# Patient Record
Sex: FEMALE | Race: BLACK OR AFRICAN AMERICAN | NOT HISPANIC OR LATINO | Employment: FULL TIME | URBAN - METROPOLITAN AREA
[De-identification: names, ages, dates, MRNs, and addresses within clinical notes are randomized per-mention and may not be internally consistent; named-entity substitution may affect disease eponyms.]

---
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
## 2021-09-20 RX ORDER — NORGESTIMATE AND ETHINYL ESTRADIOL 7DAYSX3 28
KIT ORAL
Qty: 84 TABLET | Refills: 3 | OUTPATIENT
Start: 2021-09-20

## 2021-11-02 RX ORDER — NORGESTIMATE AND ETHINYL ESTRADIOL 7DAYSX3 28
KIT ORAL
Qty: 84 TABLET | Refills: 3 | OUTPATIENT
Start: 2021-11-02

## 2021-12-07 RX ORDER — NORGESTIMATE AND ETHINYL ESTRADIOL 7DAYSX3 28
KIT ORAL
Qty: 84 TABLET | Refills: 3 | OUTPATIENT
Start: 2021-12-07

## 2021-12-15 RX ORDER — NORGESTIMATE AND ETHINYL ESTRADIOL 7DAYSX3 28
KIT ORAL
Qty: 84 TABLET | Refills: 3 | OUTPATIENT
Start: 2021-12-15

## 2021-12-15 NOTE — TELEPHONE ENCOUNTER
Left message for Melissa to call the office to schedule an AE. Last seen 10/23/2019. Needs to be seen to get a refill of her pills. Asked her to call and schedule an appt with Dr Suero or ODESSA Warren.

## 2023-05-10 ENCOUNTER — OFFICE VISIT (OUTPATIENT)
Dept: OBSTETRICS AND GYNECOLOGY | Facility: CLINIC | Age: 23
End: 2023-05-10
Payer: COMMERCIAL

## 2023-05-10 VITALS
DIASTOLIC BLOOD PRESSURE: 75 MMHG | HEART RATE: 81 BPM | HEIGHT: 61 IN | WEIGHT: 169 LBS | BODY MASS INDEX: 31.91 KG/M2 | SYSTOLIC BLOOD PRESSURE: 109 MMHG

## 2023-05-10 DIAGNOSIS — Z11.3 SCREENING FOR STD (SEXUALLY TRANSMITTED DISEASE): ICD-10-CM

## 2023-05-10 DIAGNOSIS — Z30.41 ENCOUNTER FOR SURVEILLANCE OF CONTRACEPTIVE PILLS: ICD-10-CM

## 2023-05-10 DIAGNOSIS — Z01.419 WOMEN'S ANNUAL ROUTINE GYNECOLOGICAL EXAMINATION: Primary | ICD-10-CM

## 2023-05-10 DIAGNOSIS — N94.6 DYSMENORRHEA: ICD-10-CM

## 2023-05-10 NOTE — PROGRESS NOTES
GYN Annual Exam     Chief Complaint   Patient presents with   • Annual Exam     New Gyn pt presents c/o painful cycles. She was last seen by Dr. Suero 10/23/19 for same symptoms. She would like AE today. Last AE & Pap 2022 w/Dr. Doyle.      HPI    Melissa Wilson is a 23 [facility]. female [facility]. She c/o painful menstrual cycles and would like to complete annual well woman exam today.  She is not currently sexually active. Periods are regular every 28-30 days. No intermenstrual bleeding, spotting, or discharge. Performing SBE:occas.  Denies history of migraine with aura, denies history of DVT, there is no family history of DVT. She is a nonsmoker.    C/o painful menstrual cycles since menarche. She feels this is worsening. She has treated with NSAIDs and low heating pad with no improvement. She has trouble getting out of bed and has missed work as a result on occasion due to pain. She is on Tri-sprintec which has has been on since age 17. She reports that with RANCHO menses has shortened to 3-4 days, but there has been little improvement in dysmenorrhea. She has tried skipping placebo pills to miss menses, but this has not been helpful. She is concerned that she could have endometriosis. She is has pain outside of menses and is having pain with IC. She reports family hx endometriosis (2 cousins)    This is my first time meeting Melissa Wilson  She is a patient of Dr. Suero's.  Last seen 10/23/2019  OB History        0    Para   0    Term   0       0    AB   0    Living   0       SAB   0    IAB   0    Ectopic   0    Molar   0    Multiple   0    Live Births   0                LMP- 2023  Current contraception: OCP (estrogen/progesterone)  Last Pap-  normal per pt, completed with Dr Doyle  History of abnormal Pap smear: no  History of STD-denies  Family history of uterine, colon or ovarian cancer: no  Family history of breast cancer: no  Gardasil Vaccine: completed    History  "reviewed. No pertinent past medical history.    History reviewed. No pertinent surgical history.      Current Outpatient Medications:   •  Chlorpheniramine Maleate (ALLERGY PO), Take  by mouth., Disp: , Rfl:   •  norgestimate-ethinyl estradiol (Tri-Sprintec) 0.18/0.215/0.25 MG-35 MCG per tablet, TAKE 1 TABLET BY MOUTH EVERY DAY FOR 63 DAYS AND 1 WEEK OF PLACEBO, Disp: 84 tablet, Rfl: 3  •  sertraline (ZOLOFT) 100 MG tablet, Take 1 tablet by mouth Daily., Disp: , Rfl:     No Known Allergies    Social History     Tobacco Use   • Smoking status: Never   • Smokeless tobacco: Never   Vaping Use   • Vaping Use: Every day   • Substances: Nicotine, Flavoring   Substance Use Topics   • Alcohol use: Yes   • Drug use: Never       Family History   Problem Relation Age of Onset   • No Known Problems Father    • Hypertension Mother    • Diabetes Maternal Grandmother    • Breast cancer Neg Hx    • Ovarian cancer Neg Hx    • Uterine cancer Neg Hx    • Colon cancer Neg Hx    • Deep vein thrombosis Neg Hx    • Pulmonary embolism Neg Hx        Review of Systems   Constitutional: Negative for chills, fatigue and fever.   Gastrointestinal: Positive for diarrhea (with menses). Negative for abdominal distention, abdominal pain, constipation, nausea and vomiting.   Genitourinary: Positive for menstrual problem. Negative for breast discharge, breast lump, breast pain, dysuria, pelvic pain, pelvic pressure, vaginal bleeding, vaginal discharge and vaginal pain.   Musculoskeletal: Negative for gait problem.   Skin: Negative for rash.   Neurological: Negative for dizziness and headache.   Psychiatric/Behavioral: Negative for behavioral problems.       /75   Pulse 81   Ht 154.9 cm (61\")   Wt 76.7 kg (169 lb)   LMP 04/23/2023   BMI 31.93 kg/m²     Physical Exam  Constitutional:       General: She is not in acute distress.     Appearance: Normal appearance. She is not ill-appearing, toxic-appearing or diaphoretic.   Genitourinary:      " Vulva, bladder and urethral meatus normal.      No lesions in the vagina.      Right Labia: No rash, tenderness, lesions, skin changes or Bartholin's cyst.     Left Labia: No tenderness, lesions, skin changes, Bartholin's cyst or rash.     No labial fusion noted.      No inguinal adenopathy present in the right or left side.     No vaginal discharge, erythema, tenderness, bleeding or ulceration.      No vaginal prolapse present.     No vaginal atrophy present.       Right Adnexa: not tender, not full, not palpable, no mass present and not absent.     Left Adnexa: not tender, not full, not palpable, no mass present and not absent.     No cervical motion tenderness, discharge, friability, lesion, polyp, nabothian cyst or eversion.      Uterus is not enlarged, fixed, tender, irregular or prolapsed.      No uterine mass detected.     No urethral tenderness or mass present.      Pelvic exam was performed with patient in the lithotomy position.   Breasts:     Breasts are symmetrical.      Right: Present. No swelling, bleeding, inverted nipple, mass, nipple discharge, skin change, tenderness or breast implant.      Left: Present. No swelling, bleeding, inverted nipple, mass, nipple discharge, skin change, tenderness or breast implant.   HENT:      Head: Normocephalic and atraumatic.   Eyes:      Pupils: Pupils are equal, round, and reactive to light.   Cardiovascular:      Rate and Rhythm: Normal rate.   Pulmonary:      Effort: Pulmonary effort is normal.   Abdominal:      General: There is no distension.      Palpations: Abdomen is soft. There is no mass.      Tenderness: There is no abdominal tenderness. There is no guarding.      Hernia: No hernia is present. There is no hernia in the left inguinal area or right inguinal area.   Musculoskeletal:         General: Normal range of motion.      Cervical back: Normal range of motion and neck supple. No tenderness.   Lymphadenopathy:      Cervical: No cervical adenopathy.       Upper Body:      Right upper body: No supraclavicular, axillary or pectoral adenopathy.      Left upper body: No supraclavicular, axillary or pectoral adenopathy.      Lower Body: No right inguinal adenopathy. No left inguinal adenopathy.   Neurological:      General: No focal deficit present.      Mental Status: She is alert and oriented to person, place, and time.      Cranial Nerves: No cranial nerve deficit.   Skin:     General: Skin is warm and dry.   Psychiatric:         Mood and Affect: Mood normal.         Behavior: Behavior normal.         Thought Content: Thought content normal.         Judgment: Judgment normal.   Vitals and nursing note reviewed.         Assessment   Diagnoses and all orders for this visit:    1. Women's annual routine gynecological examination (Primary)    2. Encounter for surveillance of contraceptive pills    3. Screening for STD (sexually transmitted disease)  -     NuSwab VG+ - Swab, Vagina    4. Dysmenorrhea  -     NuSwab VG+ - Swab, Vagina  -     US Non-ob Transvaginal; Future         Plan   1. Well woman exam: Pap collected No, up to date. Recommend MVI daily.    2. Contraception: does not need refill at this time  3. STD: Enc condoms. Desires STD screen today- Yes. NuSwab  4. Smoking status: nonsmoker  5.  Encouraged annual mammogram screening starting at age 40. Instructed on how to perform SBE. Encouraged breast health self awareness.  6.    Encouraged 150 minutes of exercise per week if not medially contraindicated.   7.    Obese by BMI 31.93  8.    Discussed possible causes of pelvic pain and dysmenorrhea including but not limited to, STI, REBECA in nature, GI in nature, musculoskeletal in nature, endometriosis, ovarian cyst. Discussed work up for dysmenorrhea. She would like to further discuss laparoscopy with Dr Suero. Vaginal swabs collected. TVUS ordered. Offered pelvic floor PT, she declines at this time. Offered to change RANCHO, she declines at this time. She will f/u  with Dr Suero in 3 weeks to further evaluate  9.   KIMO signed to obtain pap smear results from PCP    Follow Up 3 weeks, one year or PRN    Lynda Wilson, APRN  5/10/2023  14:28 EDT

## 2023-05-13 LAB
A VAGINAE DNA VAG QL NAA+PROBE: ABNORMAL SCORE
BVAB2 DNA VAG QL NAA+PROBE: ABNORMAL SCORE
C ALBICANS DNA VAG QL NAA+PROBE: NEGATIVE
C GLABRATA DNA VAG QL NAA+PROBE: NEGATIVE
C TRACH DNA VAG QL NAA+PROBE: NEGATIVE
MEGA1 DNA VAG QL NAA+PROBE: ABNORMAL SCORE
N GONORRHOEA DNA VAG QL NAA+PROBE: NEGATIVE
T VAGINALIS DNA VAG QL NAA+PROBE: NEGATIVE

## 2023-05-15 ENCOUNTER — TELEPHONE (OUTPATIENT)
Dept: OBSTETRICS AND GYNECOLOGY | Facility: CLINIC | Age: 23
End: 2023-05-15
Payer: COMMERCIAL

## 2023-05-15 RX ORDER — FLUCONAZOLE 150 MG/1
150 TABLET ORAL ONCE
Qty: 1 TABLET | Refills: 0 | Status: SHIPPED | OUTPATIENT
Start: 2023-05-15 | End: 2023-05-15

## 2023-05-15 NOTE — TELEPHONE ENCOUNTER
----- Message from Dayna Monreal MD sent at 5/15/2023  8:12 AM EDT -----  This please let patient know that her vaginal culture was positive for yeast and I sent Diflucan.

## 2023-05-15 NOTE — TELEPHONE ENCOUNTER
Attempted to reach pt re: results. No answer. VM not setup yet, unable to leave a message. Will try back at a later time.

## 2023-05-30 ENCOUNTER — OFFICE VISIT (OUTPATIENT)
Dept: OBSTETRICS AND GYNECOLOGY | Facility: CLINIC | Age: 23
End: 2023-05-30

## 2023-05-30 VITALS
HEIGHT: 61 IN | DIASTOLIC BLOOD PRESSURE: 61 MMHG | WEIGHT: 169 LBS | BODY MASS INDEX: 31.91 KG/M2 | SYSTOLIC BLOOD PRESSURE: 117 MMHG

## 2023-05-30 DIAGNOSIS — R10.2 PELVIC PAIN: Primary | ICD-10-CM

## 2023-05-30 NOTE — PROGRESS NOTES
SUBJECTIVE:   Chief Complaint   Patient presents with   • Follow-up     Pt here for discuss u/s due to pelvic pain during menstrual cycle         Melissa Wilson is a 23 y.o.  who presents for discussion of pelvic pain.   - menarche around age 12, started oral contraceptive pill around age 17 and this shortened her periods but they have always been painful. She takes NSAIDs and uses heat to help. She reports a family history of endometriosis in her cousins.  She has diarrhea during her menses and sometimes feels nauseated. Has considered alternative contraception such as Mirena IUD and is interested in discussing surgery.    History reviewed. No pertinent past medical history.  History reviewed. No pertinent surgical history.  OB History    Para Term  AB Living   0 0 0 0 0 0   SAB IAB Ectopic Molar Multiple Live Births   0 0 0 0 0 0      Social History     Tobacco Use   • Smoking status: Never   • Smokeless tobacco: Never   Vaping Use   • Vaping Use: Every day   • Substances: Nicotine, Flavoring   Substance Use Topics   • Alcohol use: Yes   • Drug use: Never     Family History   Problem Relation Age of Onset   • No Known Problems Father    • Hypertension Mother    • Diabetes Maternal Grandmother    • Breast cancer Neg Hx    • Ovarian cancer Neg Hx    • Uterine cancer Neg Hx    • Colon cancer Neg Hx    • Deep vein thrombosis Neg Hx    • Pulmonary embolism Neg Hx      Current Outpatient Medications on File Prior to Visit   Medication Sig Dispense Refill   • Chlorpheniramine Maleate (ALLERGY PO) Take  by mouth.     • norgestimate-ethinyl estradiol (Tri-Sprintec) 0.18/0.215/0.25 MG-35 MCG per tablet TAKE 1 TABLET BY MOUTH EVERY DAY FOR 63 DAYS AND 1 WEEK OF PLACEBO 84 tablet 3   • sertraline (ZOLOFT) 100 MG tablet Take 1 tablet by mouth Daily.       No current facility-administered medications on file prior to visit.     No Known Allergies     Review of Systems      OBJECTIVE:   Vitals:    23  "1321   BP: 117/61   Weight: 76.7 kg (169 lb)   Height: 154.9 cm (61\")      Physical Exam  Constitutional:       General: She is not in acute distress.     Appearance: She is well-developed. She is not diaphoretic.   HENT:      Head: Normocephalic and atraumatic.   Eyes:      General: No scleral icterus.     Extraocular Movements: Extraocular movements intact.   Pulmonary:      Effort: Pulmonary effort is normal. No respiratory distress.   Skin:     General: Skin is warm and dry.   Neurological:      General: No focal deficit present.      Mental Status: She is alert and oriented to person, place, and time.   Psychiatric:         Mood and Affect: Mood normal.         Behavior: Behavior normal.         Thought Content: Thought content normal.         Judgment: Judgment normal.         ASSESSMENT/PLAN:     ICD-10-CM ICD-9-CM   1. Pelvic pain  R10.2 [id number]       Reviewed ultrasound which is normal.  We discussed limitations of ultrasound in detecting endometriosis.    We discussed the differential for pelvic pain including but not limited to endometriosis, adenomyosis.  We discussed treatment options including diagnostic laparoscopy with excision of endometrial implants, hormonal therapy with birth control pills, use of other contraceptions such as Mirena IUD.  After discussion, she would like to proceed with Mirena IUD.  She understands the risks including but not limited to bleeding, injury to surrounding structures, perforation (ending up in abdomen), expulsion (falling out), need for surgery/procedure for removal, pain with insertion and removal, failure, ectopic pregnancy. She is understanding that if her pain was refractory to treatment, then a laparoscopy would be a reasonable next step.        No orders of the defined types were placed in this encounter.      Return in about 2 weeks (around 6/13/2023) for IUD insertion.            "

## 2023-06-13 ENCOUNTER — OFFICE VISIT (OUTPATIENT)
Dept: OBSTETRICS AND GYNECOLOGY | Facility: CLINIC | Age: 23
End: 2023-06-13
Payer: COMMERCIAL

## 2023-06-13 VITALS
DIASTOLIC BLOOD PRESSURE: 73 MMHG | SYSTOLIC BLOOD PRESSURE: 123 MMHG | BODY MASS INDEX: 31.15 KG/M2 | HEIGHT: 61 IN | HEART RATE: 89 BPM | WEIGHT: 165 LBS

## 2023-06-13 DIAGNOSIS — Z30.014 ENCOUNTER FOR INITIAL PRESCRIPTION OF INTRAUTERINE CONTRACEPTIVE DEVICE (IUD): Primary | ICD-10-CM

## 2023-06-13 LAB
B-HCG UR QL: NEGATIVE
EXPIRATION DATE: NORMAL
INTERNAL NEGATIVE CONTROL: NEGATIVE
INTERNAL POSITIVE CONTROL: POSITIVE
Lab: NORMAL

## 2023-06-13 RX ORDER — FLUTICASONE PROPIONATE 50 MCG
SPRAY, SUSPENSION (ML) NASAL
COMMUNITY
Start: 2023-05-11

## 2023-06-13 NOTE — PROGRESS NOTES
Mirena IUD Insertion Procedure Note    Indications: Desires contraception with Mirena IUD    Pre Procedural Diagnosis: Desires contraception with Mirena IUD    Post Procedural Diagnosis: Same    Counseling:  Patient was counseled on risks of IUD insertion including but not limited to abnormal bleeding, infection, uterine perforation, expulsion, failure of contraception resulting in pregnancy, need for additional procedures and/or need for surgery.  All questions were answered to apparent satisfaction.  She was counseled about the duration of treatment, recommended removal in 8 years.  She was advised to perform monthly string checks and to see evaluation with unexpected changes in bleeding patterns and/or unable to feel the strings.      Procedure Details   Urine pregnancy test was done, and result was negative.  Gonorrhea/chlamydia testing was done and was NEGATIVE .    Bimanual exam revealed Anteverted. Cervix cleansed with Betadine. Tenaculum applied to the anterior lip.  Uterus sounded to 7 cm. A cervical dilator was used to easily pass the IUD.  IUD inserted without difficulty. String visible and trimmed. Patient tolerated procedure well.    IUD Information:  See medication record.    Condition:  Stable    Complications:  None    Plan:    The patient was advised to call for any fever or for prolonged or severe pain or bleeding; she was also advised to use a back up method of contraception for 7 days or abstain from intercourse. She was advised to use {OTC medication as needed for mild to moderate pain.  Return to the clinic in 4 weeks for follow-up.    Post procedure ultrasound showed an IUD lower uterine segment.  Pt counseled.  She is still feeing cramping but this was immediately after insertion. She was offered removal, replacement or expectant management with follow up US in 1-2 weeks. She prefers to repeat US in 1-2 weeks and will schedule. Call with issues.

## 2023-10-13 ENCOUNTER — CLINICAL SUPPORT (OUTPATIENT)
Dept: OBSTETRICS AND GYNECOLOGY | Facility: CLINIC | Age: 23
End: 2023-10-13
Payer: COMMERCIAL

## 2023-10-13 VITALS — BODY MASS INDEX: 30.66 KG/M2 | HEIGHT: 61 IN

## 2023-10-13 DIAGNOSIS — Z30.42 ENCOUNTER FOR MANAGEMENT AND INJECTION OF DEPO-PROVERA: Primary | ICD-10-CM

## 2023-10-13 LAB
B-HCG UR QL: NEGATIVE
EXPIRATION DATE: NORMAL
INTERNAL NEGATIVE CONTROL: NORMAL
INTERNAL POSITIVE CONTROL: NORMAL
Lab: NORMAL

## 2023-10-13 RX ORDER — MEDROXYPROGESTERONE ACETATE 150 MG/ML
150 INJECTION, SUSPENSION INTRAMUSCULAR ONCE
Status: COMPLETED | OUTPATIENT
Start: 2023-10-13 | End: 2023-10-13

## 2023-10-13 RX ADMIN — MEDROXYPROGESTERONE ACETATE 150 MG: 150 INJECTION, SUSPENSION INTRAMUSCULAR at 12:01

## 2024-02-16 ENCOUNTER — CLINICAL SUPPORT (OUTPATIENT)
Dept: OBSTETRICS AND GYNECOLOGY | Facility: CLINIC | Age: 24
End: 2024-02-16
Payer: COMMERCIAL

## 2024-02-16 VITALS — HEIGHT: 61 IN | BODY MASS INDEX: 30.67 KG/M2

## 2024-02-16 DIAGNOSIS — Z30.42 ENCOUNTER FOR MANAGEMENT AND INJECTION OF DEPO-PROVERA: Primary | ICD-10-CM

## 2024-02-16 RX ORDER — MEDROXYPROGESTERONE ACETATE 150 MG/ML
150 INJECTION, SUSPENSION INTRAMUSCULAR ONCE
Status: COMPLETED | OUTPATIENT
Start: 2024-02-16 | End: 2024-02-16

## 2024-02-16 RX ADMIN — MEDROXYPROGESTERONE ACETATE 150 MG: 150 INJECTION, SUSPENSION INTRAMUSCULAR at 14:23

## 2024-05-17 ENCOUNTER — CLINICAL SUPPORT (OUTPATIENT)
Dept: OBSTETRICS AND GYNECOLOGY | Facility: CLINIC | Age: 24
End: 2024-05-17
Payer: COMMERCIAL

## 2024-05-17 VITALS
HEIGHT: 61 IN | WEIGHT: 147 LBS | DIASTOLIC BLOOD PRESSURE: 77 MMHG | SYSTOLIC BLOOD PRESSURE: 116 MMHG | BODY MASS INDEX: 27.75 KG/M2

## 2024-05-17 DIAGNOSIS — Z30.42 ENCOUNTER FOR MANAGEMENT AND INJECTION OF DEPO-PROVERA: Primary | ICD-10-CM

## 2024-05-17 RX ORDER — MEDROXYPROGESTERONE ACETATE 150 MG/ML
150 INJECTION, SUSPENSION INTRAMUSCULAR ONCE
Status: COMPLETED | OUTPATIENT
Start: 2024-05-17 | End: 2024-05-17

## 2024-05-17 RX ADMIN — MEDROXYPROGESTERONE ACETATE 150 MG: 150 INJECTION, SUSPENSION INTRAMUSCULAR at 13:41

## 2024-07-19 ENCOUNTER — TELEPHONE (OUTPATIENT)
Dept: OBSTETRICS AND GYNECOLOGY | Facility: CLINIC | Age: 24
End: 2024-07-19
Payer: COMMERCIAL

## 2024-07-19 NOTE — TELEPHONE ENCOUNTER
Pt called stating she has started bleeding, she has an apt to get her depo shot August 2nd. Pt is wanting to see if that is normal or if she should make an apt.     Please Advise, Thank you

## 2024-07-22 NOTE — TELEPHONE ENCOUNTER
There can commonly be some breakthrough bleeding blane in the last couple weeks before DMPA is due. If it is heavy (such as saturating a pad in an hour) or she is in pain, please let us know.

## 2024-08-02 ENCOUNTER — TELEPHONE (OUTPATIENT)
Dept: OBSTETRICS AND GYNECOLOGY | Facility: CLINIC | Age: 24
End: 2024-08-02
Payer: COMMERCIAL

## 2024-08-02 RX ORDER — MEDROXYPROGESTERONE ACETATE 150 MG/ML
150 INJECTION, SUSPENSION INTRAMUSCULAR
Qty: 1 ML | Refills: 0 | Status: SHIPPED | OUTPATIENT
Start: 2024-08-02

## 2024-08-02 NOTE — TELEPHONE ENCOUNTER
Lennie,     Rx sent    Thanks,   Dr. Yoko Suero pt     Requesting depo refill please be sent to pharmacy. Thank you!    Pharmacy -   [id number] - [place], [place] - [facility] [place] [address] [address]. AT [facility] - [phone] [facility] [phone] FX

## 2024-08-15 ENCOUNTER — CLINICAL SUPPORT (OUTPATIENT)
Dept: OBSTETRICS AND GYNECOLOGY | Facility: CLINIC | Age: 24
End: 2024-08-15
Payer: COMMERCIAL

## 2024-08-15 VITALS — WEIGHT: 146 LBS | BODY MASS INDEX: 27.6 KG/M2

## 2024-08-15 DIAGNOSIS — Z30.09 BIRTH CONTROL COUNSELING: Primary | ICD-10-CM

## 2024-08-15 PROCEDURE — 96372 THER/PROPH/DIAG INJ SC/IM: CPT | Performed by: OBSTETRICS & GYNECOLOGY

## 2024-08-15 RX ORDER — MEDROXYPROGESTERONE ACETATE 150 MG/ML
150 INJECTION, SUSPENSION INTRAMUSCULAR ONCE
Status: COMPLETED | OUTPATIENT
Start: 2024-08-15 | End: 2024-08-15

## 2024-08-15 RX ADMIN — MEDROXYPROGESTERONE ACETATE 150 MG: 150 INJECTION, SUSPENSION INTRAMUSCULAR at 15:42

## 2024-11-07 ENCOUNTER — TELEPHONE (OUTPATIENT)
Dept: OBSTETRICS AND GYNECOLOGY | Facility: CLINIC | Age: 24
End: 2024-11-07
Payer: COMMERCIAL

## 2024-11-07 RX ORDER — MEDROXYPROGESTERONE ACETATE 150 MG/ML
150 INJECTION, SUSPENSION INTRAMUSCULAR
Qty: 1 ML | Refills: 0 | Status: SHIPPED | OUTPATIENT
Start: 2024-11-07

## 2024-11-07 NOTE — TELEPHONE ENCOUNTER
Do you mind to send in a refill of depo for patient? Annual has been scheduled for January with Lynda.     Thanks!

## 2024-11-07 NOTE — TELEPHONE ENCOUNTER
Hub staff attempted to follow warm transfer process and was unsuccessful     Caller: Melissa Wilson    Relationship to patient: Self    Best call back number: [phone]      Patient is needing: PT CALLED IN TO SCHEDULE A DEPO APPT

## 2024-11-15 ENCOUNTER — CLINICAL SUPPORT (OUTPATIENT)
Dept: OBSTETRICS AND GYNECOLOGY | Facility: CLINIC | Age: 24
End: 2024-11-15
Payer: COMMERCIAL

## 2024-11-15 VITALS
DIASTOLIC BLOOD PRESSURE: 61 MMHG | SYSTOLIC BLOOD PRESSURE: 101 MMHG | HEIGHT: 61 IN | BODY MASS INDEX: 26.43 KG/M2 | HEART RATE: 76 BPM | WEIGHT: 140 LBS

## 2024-11-15 DIAGNOSIS — Z30.42 ENCOUNTER FOR MANAGEMENT AND INJECTION OF DEPO-PROVERA: Primary | ICD-10-CM

## 2024-11-15 RX ORDER — MEDROXYPROGESTERONE ACETATE 150 MG/ML
150 INJECTION, SUSPENSION INTRAMUSCULAR ONCE
Status: COMPLETED | OUTPATIENT
Start: 2024-11-15 | End: 2024-11-15

## 2024-11-15 RX ADMIN — MEDROXYPROGESTERONE ACETATE 150 MG: 150 INJECTION, SUSPENSION INTRAMUSCULAR at 13:41

## 2024-11-15 NOTE — PROGRESS NOTES
Pt is here today for depo provera injec. 150 mg IM R V. Pt tolerated well no reaction    [id number]  [id number]  EXP-11/2026

## 2025-02-05 RX ORDER — MEDROXYPROGESTERONE ACETATE 150 MG/ML
1 INJECTION, SUSPENSION INTRAMUSCULAR
Qty: 1 EACH | Refills: 4 | OUTPATIENT
Start: 2025-02-05

## 2025-02-06 RX ORDER — MEDROXYPROGESTERONE ACETATE 150 MG/ML
150 INJECTION, SUSPENSION INTRAMUSCULAR
Qty: 1 ML | Refills: 0 | OUTPATIENT
Start: 2025-02-06

## 2025-02-06 NOTE — TELEPHONE ENCOUNTER
Pt called office very angry office will not fill depo. Pt has been canceling ae appts. Informed pt she is very overdue for an annual. Pt has scheduled again for 4/25/25 and is requesting again depo filled.     Can pt depo be refilled or would you like to see pt first?    Please advise, thank you

## 2025-02-07 RX ORDER — MEDROXYPROGESTERONE ACETATE 150 MG/ML
1 INJECTION, SUSPENSION INTRAMUSCULAR
Qty: 1 ML | Refills: 0 | Status: SHIPPED | OUTPATIENT
Start: 2025-02-07

## 2025-02-14 ENCOUNTER — CLINICAL SUPPORT (OUTPATIENT)
Dept: OBSTETRICS AND GYNECOLOGY | Facility: CLINIC | Age: 25
End: 2025-02-14
Payer: COMMERCIAL

## 2025-02-14 DIAGNOSIS — Z30.42 ENCOUNTER FOR MANAGEMENT AND INJECTION OF DEPO-PROVERA: Primary | ICD-10-CM

## 2025-02-14 RX ORDER — MEDROXYPROGESTERONE ACETATE 150 MG/ML
150 INJECTION, SUSPENSION INTRAMUSCULAR ONCE
Status: COMPLETED | OUTPATIENT
Start: 2025-02-14 | End: 2025-02-14

## 2025-02-14 RX ADMIN — MEDROXYPROGESTERONE ACETATE 150 MG: 150 INJECTION, SUSPENSION INTRAMUSCULAR at 11:37

## 2025-05-02 RX ORDER — MEDROXYPROGESTERONE ACETATE 150 MG/ML
1 INJECTION, SUSPENSION INTRAMUSCULAR
Qty: 1 EACH | OUTPATIENT
Start: 2025-05-02

## 2025-07-25 ENCOUNTER — TELEPHONE (OUTPATIENT)
Dept: OBSTETRICS AND GYNECOLOGY | Facility: CLINIC | Age: 25
End: 2025-07-25
Payer: COMMERCIAL

## 2025-08-10 RX ORDER — MEDROXYPROGESTERONE ACETATE 150 MG/ML
150 INJECTION, SUSPENSION INTRAMUSCULAR
Qty: 1 EACH | Refills: 3 | Status: CANCELLED | OUTPATIENT
Start: 2025-08-10

## 2025-08-14 ENCOUNTER — OFFICE VISIT (OUTPATIENT)
Dept: OBSTETRICS AND GYNECOLOGY | Facility: CLINIC | Age: 25
End: 2025-08-14
Payer: COMMERCIAL

## 2025-08-14 VITALS
SYSTOLIC BLOOD PRESSURE: 102 MMHG | HEIGHT: 61 IN | DIASTOLIC BLOOD PRESSURE: 70 MMHG | BODY MASS INDEX: 24.92 KG/M2 | WEIGHT: 132 LBS

## 2025-08-14 DIAGNOSIS — N94.6 DYSMENORRHEA: ICD-10-CM

## 2025-08-14 DIAGNOSIS — Z01.411 ENCOUNTER FOR GYNECOLOGICAL EXAMINATION WITH ABNORMAL FINDING: Primary | ICD-10-CM

## 2025-08-14 DIAGNOSIS — Z11.3 ENCOUNTER FOR SCREENING EXAMINATION FOR SEXUALLY TRANSMITTED DISEASE: ICD-10-CM

## 2025-08-14 DIAGNOSIS — Z30.011 ENCOUNTER FOR INITIAL PRESCRIPTION OF CONTRACEPTIVE PILLS: ICD-10-CM

## 2025-08-14 RX ORDER — BUPROPION HYDROCHLORIDE 150 MG/1
TABLET ORAL
COMMUNITY

## 2025-08-14 RX ORDER — IBUPROFEN 600 MG/1
600 TABLET, FILM COATED ORAL EVERY 6 HOURS PRN
Qty: 20 TABLET | Refills: 11 | Status: SHIPPED | OUTPATIENT
Start: 2025-08-14 | End: 2025-08-19

## 2025-08-14 RX ORDER — LEVONORGESTREL AND ETHINYL ESTRADIOL 0.15-0.03
1 KIT ORAL DAILY
Qty: 84 TABLET | Refills: 3 | Status: SHIPPED | OUTPATIENT
Start: 2025-08-14 | End: 2026-08-14

## 2025-08-15 LAB
HBV SURFACE AG SERPL QL IA: NEGATIVE
HCV IGG SERPL QL IA: NON REACTIVE
HIV 1+2 AB+HIV1 P24 AG SERPL QL IA: NON REACTIVE
HSV1 IGG SERPL QL IA: NON REACTIVE
HSV2 IGG SERPL QL IA: NON REACTIVE

## 2025-08-18 LAB
C TRACH RRNA CVX QL NAA+PROBE: NEGATIVE
CONV .: NORMAL
CYTOLOGIST CVX/VAG CYTO: NORMAL
CYTOLOGY CVX/VAG DOC CYTO: NORMAL
CYTOLOGY CVX/VAG DOC THIN PREP: NORMAL
DX ICD CODE: NORMAL
N GONORRHOEA RRNA CVX QL NAA+PROBE: NEGATIVE
OTHER STN SPEC: NORMAL
SERVICE CMNT-IMP: NORMAL
STAT OF ADQ CVX/VAG CYTO-IMP: NORMAL
T VAGINALIS RRNA SPEC QL NAA+PROBE: NEGATIVE
TREPONEMA PALLIDUM IGG+IGM AB [PRESENCE] IN SERUM OR PLASMA BY IMMUNOASSAY: NON REACTIVE